# Patient Record
Sex: MALE | Race: WHITE | Employment: FULL TIME | URBAN - METROPOLITAN AREA
[De-identification: names, ages, dates, MRNs, and addresses within clinical notes are randomized per-mention and may not be internally consistent; named-entity substitution may affect disease eponyms.]

---

## 2017-07-24 PROBLEM — R07.89 ATYPICAL CHEST PAIN: Status: ACTIVE | Noted: 2017-07-24

## 2017-07-24 PROBLEM — E78.2 MIXED HYPERLIPIDEMIA: Status: ACTIVE | Noted: 2017-07-24

## 2017-07-24 PROBLEM — F17.200 SMOKER: Status: ACTIVE | Noted: 2017-07-24

## 2021-04-26 ENCOUNTER — IMMUNIZATION (OUTPATIENT)
Dept: PRIMARY CARE CLINIC | Age: 56
End: 2021-04-26

## 2021-04-26 PROCEDURE — 0001A COVID-19, PFIZER VACCINE 30MCG/0.3ML DOSE: CPT | Performed by: NURSE PRACTITIONER

## 2021-04-26 PROCEDURE — 91300 COVID-19, PFIZER VACCINE 30MCG/0.3ML DOSE: CPT | Performed by: NURSE PRACTITIONER

## 2021-05-24 ENCOUNTER — IMMUNIZATION (OUTPATIENT)
Dept: PRIMARY CARE CLINIC | Age: 56
End: 2021-05-24
Payer: COMMERCIAL

## 2021-05-24 PROCEDURE — 91300 COVID-19, PFIZER VACCINE 30MCG/0.3ML DOSE: CPT

## 2021-05-24 PROCEDURE — 0002A COVID-19, PFIZER VACCINE 30MCG/0.3ML DOSE: CPT

## 2022-03-24 ENCOUNTER — APPOINTMENT (OUTPATIENT)
Dept: CT IMAGING | Age: 57
End: 2022-03-24
Payer: COMMERCIAL

## 2022-03-24 ENCOUNTER — HOSPITAL ENCOUNTER (EMERGENCY)
Age: 57
Discharge: HOME | End: 2022-03-24
Attending: EMERGENCY MEDICINE
Payer: COMMERCIAL

## 2022-03-24 VITALS
BODY MASS INDEX: 24.33 KG/M2 | RESPIRATION RATE: 16 BRPM | HEIGHT: 67 IN | OXYGEN SATURATION: 96 % | HEART RATE: 76 BPM | DIASTOLIC BLOOD PRESSURE: 98 MMHG | SYSTOLIC BLOOD PRESSURE: 145 MMHG | WEIGHT: 155 LBS | TEMPERATURE: 98.5 F

## 2022-03-24 DIAGNOSIS — S09.90XA INJURY OF HEAD, INITIAL ENCOUNTER: Primary | ICD-10-CM

## 2022-03-24 DIAGNOSIS — S01.01XA LACERATION OF SCALP, INITIAL ENCOUNTER: ICD-10-CM

## 2022-03-24 DIAGNOSIS — Z23 NEED FOR TETANUS BOOSTER: ICD-10-CM

## 2022-03-24 PROCEDURE — 70450 CT HEAD/BRAIN W/O DYE: CPT

## 2022-03-24 PROCEDURE — 96372 THER/PROPH/DIAG INJ SC/IM: CPT

## 2022-03-24 PROCEDURE — 90471 IMMUNIZATION ADMIN: CPT | Performed by: NURSE PRACTITIONER

## 2022-03-24 PROCEDURE — 12002 RPR S/N/AX/GEN/TRNK2.6-7.5CM: CPT

## 2022-03-24 PROCEDURE — 90714 TD VACC NO PRESV 7 YRS+ IM: CPT | Performed by: NURSE PRACTITIONER

## 2022-03-24 PROCEDURE — 6360000002 HC RX W HCPCS: Performed by: NURSE PRACTITIONER

## 2022-03-24 PROCEDURE — 99284 EMERGENCY DEPT VISIT MOD MDM: CPT

## 2022-03-24 PROCEDURE — 72125 CT NECK SPINE W/O DYE: CPT

## 2022-03-24 RX ORDER — TETANUS AND DIPHTHERIA TOXOIDS ADSORBED 2; 2 [LF]/.5ML; [LF]/.5ML
0.5 INJECTION INTRAMUSCULAR ONCE
Status: COMPLETED | OUTPATIENT
Start: 2022-03-24 | End: 2022-03-24

## 2022-03-24 RX ORDER — IBUPROFEN 600 MG/1
600 TABLET ORAL EVERY 8 HOURS PRN
Qty: 30 TABLET | Refills: 0 | Status: SHIPPED | OUTPATIENT
Start: 2022-03-24 | End: 2022-03-24 | Stop reason: SDUPTHER

## 2022-03-24 RX ORDER — IBUPROFEN 600 MG/1
600 TABLET ORAL EVERY 8 HOURS PRN
Qty: 30 TABLET | Refills: 0 | Status: SHIPPED | OUTPATIENT
Start: 2022-03-24

## 2022-03-24 RX ORDER — ACETAMINOPHEN 500 MG
1000 TABLET ORAL ONCE
Status: DISCONTINUED | OUTPATIENT
Start: 2022-03-24 | End: 2022-03-24 | Stop reason: HOSPADM

## 2022-03-24 RX ADMIN — TETANUS AND DIPHTHERIA TOXOIDS ADSORBED 0.5 ML: 2; 2 INJECTION INTRAMUSCULAR at 12:09

## 2022-03-24 NOTE — ED NOTES
Patient arrived to the ED via ems for an incident during work. Patient has laceration on top of head near crown.  Patient states no loss of consciousness, alert and oriented x4, and able to make needs known     [place] SHERRILL Jackson  03/24/22 7775

## 2022-03-24 NOTE — ED PROVIDER NOTES
ED Attending shared visit  CC: [facility]  Department of Emergency Medicine   ED  Encounter Note  Admit Date/RoomTime: 3/24/2022 11:21 AM  ED Room: 36    NAME: Tash Weber  : 1965  MRN: [id number]     Chief Complaint:  Laceration (Pt was at work today and leaned forward, when he came back up he hit the back of his head on a large machine. Pt elias approx 1\" laceration to the left occipital region. Pt denies LOC. No blood thinners. Bleeding controlled on arrival to ED)    History of Present Illness         Tash Weber is a 49 y.o. old male who presents to the emergency department by private vehicle, for head injury which occured a few minute(s) prior to arrival. The complaint is due to stood up at work and hit his head on a large machine that he did not realize was above him. Loss of consciousness did not occur. The injury has been associated with laceration and denies any confusion, blurred vision, diplopia, loss of vision, slurred speech, focal weakness, focal sensory loss, nausea, vomiting, incontinence or seizure activity. Previous head injury: no.  Since onset the symptoms have been mild in degree. His pain is aggraveated by palpation and relieved by nothing. He takes no blood thinning agents. The patients tetanus status is unknown. ROS   Pertinent positives and negatives are stated within HPI, all other systems reviewed and are negative. Past Medical History:  has no past medical history on file. Surgical History:  has a past surgical history that includes hernia repair and Tonsillectomy. Social History:  reports that he has been smoking cigarettes. He has a 10.00 pack-year smoking history. He has never used smokeless tobacco. He reports current alcohol use. He reports that he does not use drugs. Family History: family history is not on file. Allergies: Patient has no known allergies.     Physical Exam   Oxygen Saturation Interpretation: Normal.        ED Triage Vitals [03/24/22 1126]   BP Temp Temp Source Pulse Resp SpO2 Height Weight   (!) 148/101 98.2 °F (36.8 °C) Oral 75 18 96 % 5' 7\" (1.702 m) 155 lb (70.3 kg)         Constitutional: Level of Consciousness: Awake and alert, cooperative. ETOH: No.               Distress: none. Head:  Traumatic:  yes. Scalp Tenderness:  parietal.                  Deformity: no.               Skin:  Laceration 5cm L shaped left parietal scalp. Eyes:  PERRL, EOMI. No periorbital ecchymoses. Conjunctiva: normal.  Ears:  External ears without lesions. Throat:  Airway patient. Neck:  Supple. No midline tenderness, full ROM. Chest:  Symmetrical without visible rash or tenderness. Respiratory:  Clear to auscultation and breath sounds equal.  CV:  Regular rate and rhythm, normal heart sounds, without pathological murmurs. GI:  Abdomen Soft, nontender. No abrasions, ecchymoses, or lacerations. Back:  No costovertebral, paravertebral, intervertebral, or vertebral tenderness or spasm. Integument:  No abrasions, ecchymoses, or lacerations unless noted elsewhere. Extremities  No tenderness or swelling. Normal, painless range of motion. No neurovascular deficit. Neurological:  Oriented x 3,  GCS15. Motor functions intact. Lab / Imaging Results   (All laboratory and radiology results have been personally reviewed by myself)  Labs:  No results found for this visit on 03/24/22. Imaging: All Radiology results interpreted by Radiologist unless otherwise noted. CT HEAD WO CONTRAST   Final Result   No acute intracranial abnormality. RECOMMENDATIONS:   Unavailable         CT CERVICAL SPINE WO CONTRAST   Final Result   No acute abnormality of the cervical spine.       RECOMMENDATIONS:   Unavailable           ED Course / Medical Decision Making     Medications   acetaminophen (TYLENOL) tablet 1,000 mg (1,000 mg Oral Not Given 3/24/22 1213)   diptheria-tetanus toxoids [facility]) 2-2 LF/0.5ML injection 0.5 mL (0.5 mLs IntraMUSCular Given 3/24/22 1209)          Consult(s):   None    Procedure(s):  PROCEDURE NOTE  3/24/22       Time: 1209    LACERATION REPAIR  Risks, benefits and alternatives (for applicable procedures below) described. Performed By: MARY Roa CNP. Informed consent: Verbal consent obtained. The patient was counseled regarding the procedure in person, it's indications, risks, potential complications and alternatives and any questions were answered. Verbal consent was obtained. Laceration #: 1  Location: left parietal scalp  Length: 5 cm. The wound area was irrigated with sterile saline, cleansed with chlorhexidine gluconate and draped in a sterile fashion. Local Anesthesia:  not required/indicated. The wound was explored with the following results: Thickness: partial thickness. no foreign body or tendon injury seen. Debridement: None. Undermining: None. Wound Margins Revised: no.  Flaps Aligned: yes. The wound was closed with #5 staple(s). Dressing:  no dressing required. There were no additional wounds requiring formal closure. MDM:   Patient is well-appearing, GCS of 15. Presents status post head injury. No fall to the ground or loss of consciousness. He is not on any anticoagulants. CT scans of the head and cervical spine obtained negative for any acute findings. Laceration to scalp cleaned irrigated and closed with staples patient tolerated well tetanus updated. Plan will be discharged home with outpatient follow-up with [facility] for [place]. He was educated on signs and symptoms which require emergent reevaluation. Plan of Care/Counseling:  MARY Roa CNP reviewed today's visit with the patient in addition to providing specific details for the plan of care and counseling regarding the diagnosis and prognosis.   Questions are answered at this time and are agreeable with the plan.    Assessment      1. Injury of head, initial encounter    2. Laceration of scalp, initial encounter    3. Need for tetanus booster      Plan   Discharged home. Patient condition is good    New Medications     Current Discharge Medication List      START taking these medications    Details   ibuprofen (IBU) 600 MG tablet Take 1 tablet by mouth every 8 hours as needed for Pain or Fever Take with food. Qty: 30 tablet, Refills: 0           Electronically signed by MARY Michelle CNP   DD: 3/24/22  **This report was transcribed using voice recognition software. Every effort was made to ensure accuracy; however, inadvertent computerized transcription errors may be present.   END OF ED PROVIDER NOTE        MARY Szymanski CNP  03/24/22 [id number] [phone]

## 2022-03-24 NOTE — ED NOTES
Reviewed discharge instructions with patient, all questions answered. Provided patient with copy of [employer] claim paperwork. Patient verbalized understanding and provided signature on paperwork. Patient ambulated on own towards exit.        Aditi Medina RN  03/24/22 2971